# Patient Record
Sex: FEMALE | Race: WHITE
[De-identification: names, ages, dates, MRNs, and addresses within clinical notes are randomized per-mention and may not be internally consistent; named-entity substitution may affect disease eponyms.]

---

## 2017-07-26 ENCOUNTER — HOSPITAL ENCOUNTER (EMERGENCY)
Dept: HOSPITAL 74 - FER | Age: 47
Discharge: HOME | End: 2017-07-26
Payer: COMMERCIAL

## 2017-07-26 VITALS — HEART RATE: 56 BPM | DIASTOLIC BLOOD PRESSURE: 69 MMHG | SYSTOLIC BLOOD PRESSURE: 117 MMHG

## 2017-07-26 VITALS — BODY MASS INDEX: 19.2 KG/M2

## 2017-07-26 DIAGNOSIS — Z87.891: ICD-10-CM

## 2017-07-26 DIAGNOSIS — R55: Primary | ICD-10-CM

## 2017-07-26 LAB
ALBUMIN SERPL-MCNC: 4.5 G/DL (ref 3.5–5)
ALP SERPL-CCNC: 49 U/L (ref 32–92)
ALT SERPL-CCNC: 21 U/L (ref 10–40)
ANION GAP SERPL CALC-SCNC: 5 MMOL/L (ref 8–16)
AST SERPL-CCNC: 22 U/L (ref 10–42)
BASOPHILS # BLD: 0.6 % (ref 0–2)
BILIRUB SERPL-MCNC: 1 MG/DL (ref 0.2–1)
CALCIUM SERPL-MCNC: 9.4 MG/DL (ref 8.4–10.2)
CK SERPL-CCNC: 66 IU/L (ref 26–140)
CO2 SERPL-SCNC: 27 MMOL/L (ref 22–28)
CREAT SERPL-MCNC: 0.6 MG/DL (ref 0.6–1.3)
DEPRECATED RDW RBC AUTO: 11.8 % (ref 11.6–15.6)
EOSINOPHIL # BLD: 0.8 % (ref 0–4.5)
GLUCOSE SERPL-MCNC: 98 MG/DL (ref 74–106)
MCH RBC QN AUTO: 29.6 PG (ref 25.7–33.7)
MCHC RBC AUTO-ENTMCNC: 33.3 G/DL (ref 32–36)
MCV RBC: 88.7 FL (ref 80–96)
NEUTROPHILS # BLD: 68.9 % (ref 42.8–82.8)
PLATELET # BLD AUTO: 217 K/MM3 (ref 134–434)
PMV BLD: 8.4 FL (ref 7.5–11.1)
PROT SERPL-MCNC: 6.8 G/DL (ref 6.4–8.3)
TROPONIN I SERPL-MCNC: < 0.03 NG/ML (ref 0.03–0.5)
WBC # BLD AUTO: 7.8 K/MM3 (ref 4–10.8)

## 2017-07-26 PROCEDURE — 3E0337Z INTRODUCTION OF ELECTROLYTIC AND WATER BALANCE SUBSTANCE INTO PERIPHERAL VEIN, PERCUTANEOUS APPROACH: ICD-10-PCS

## 2017-07-26 NOTE — PDOC
History of Present Illness





- General


Chief Complaint: Syncope/Near Syncope


Stated Complaint: BLACK OUT/ HIT MY HEAD


Time Seen by Provider: 07/26/17 07:01





- History of Present Illness


Initial Comments: 


07/26/17 07:59


Chief complaint: Loss of consciousness





History of present illness: Patient recounts that soon after awakening this 

morning, she had severe crampy lower abdominal pain, had a bowel movement, and 

upon finishing the pain resolved but she felt lightheaded, diaphoretic, 

nauseated, and suddenly fell to the floor, losing consciousness for a few 

moments. She awoke without confusion but with a mild headache, though no head 

injury was apparent. 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 

syncope in the past, but this resolved completely and she has had no further 

episodes since she had an ablation in 1996. She continues to see cardiologist, 

Dr. Gutiérrez, on a regular basis, and had a normal stress test approximately 

one year ago for clearance prior to vein stripping on the left leg. There was 

no history of angina or coronary artery disease.





Review of systems: Denies chest pain, and at present abdominal pain, nausea, 

vomiting, diarrhea, hematemesis, melena, bloody stool, vaginal bleeding or 

discharge, urinary tract symptoms including dysuria, frequency, urgency, 

hesitancy, hematuria, visual or focal neurologic symptoms, unsteadiness of gait

, headache, lightheadedness, dizziness, vertigo. She admits mild dyspnea on 

exertion which has been present for several months, but for which she has not 

sought medical attention. All other systems reviewed and found to be negative





Past medical history: WPW with ablation, as noted above, prior syncope due to 

WPW before ablation, hysterectomy without oophorectomy, vein stripping left leg

, sinus surgery, migraine headaches,





Social history: Denies tobacco, alcohol, or nonprescription drugs. Fully active 

without disability. No recent weight gain or weight loss





Family history: Reviewed and noncontributory including early coronary artery 

disease or other cardiac diseases, metabolic disease including diabetes, cancer.





Physical exam: Alert and oriented 3, well-developed well-nourished, no acute 

distress, cheerful and cooperative. Asymptomatic at present


Afebrile, vital signs normal except for mild bradycardia, which is regular, and 

mild orthostatic changes.


Head with approximately 2 cm contusion left frontoparietal area. No depression 

or crepitus. No ecchymosis or fluctuance. No abrasion or laceration. Mildly 

tender to palpation.


PERRLA 3 mm, fundi benign with sharp disc margins and good central venous 

pulsations, no hemorrhages or exudates. Visual fields intact to confrontation


ENT clear


Neck supple without bruit mass or nodes. No point tenderness over the cervical 

spine. Full range of motion without pain


Chest clear to P&A


CV regular without murmur rub or gallop pulses full and symmetric no JVD or 

edema no bruits heart rate 55 and regular


Abdomen nondistended, bowel sounds normal, soft without mass tenderness or 

organomegaly


Neuro C2 to 12 intact. Strength full and symmetric. No focal sensory or motor 

deficits. Gait stable and unimpaired. Cerebellar intact


Skin clear, no rash, adequate turgor and wet mucous membranes


Extremities no CCE. No posterior calf swelling or tenderness.





Impression: This probably represents a vasovagal episode after experiencing 

abdominal pain and having a bowel movement. There is a history of syncope 

related to WPW which resolved completely after ablation in 1996, with no 

subsequent episodes until today. The patient has a mild bradycardia, and an old 

EKG obtained and consultation with the cardiologist will be undertaken.





Plan: EKG and enzymes, chest x-ray, and labs. Further medical management 

depending on results.














07/26/17 08:30








Past History





- Past Medical History


Allergies/Adverse Reactions: 


 Allergies











Allergy/AdvReac Type Severity Reaction Status Date / Time


 


No Known Drug Allergies Allergy   Verified 07/26/17 07:57


 


gluten Allergy Intermediate Nausea Uncoded 07/26/17 07:57











Home Medications: 


Ambulatory Orders





NK [No Known Home Medication]  07/26/17 








Anemia: No


Asthma: No


Cancer: No


Cardiac Disorders: No


CVA: No


COPD: No


CHF: No


Dementia: No


Diabetes: No


GI Disorders: No


 Disorders: No


HTN: No


Hypercholesterolemia: No


Liver Disease: No


Seizures: No


Thyroid Disease: No





- Surgical History


Abdominal Surgery: No


Appendectomy: No


Cardiac Surgery: No (wpw=ablation 1996)


Cholecystectomy: No


Lung Surgery: No


Neurologic Surgery: No


Orthopedic Surgery: No





- Psycho/Social/Smoking Cessation Hx


Anxiety: No


Suicidal Ideation: No


Smoking History: Former smoker


Have you smoked in the past 12 months: No


If you are a former smoker, when did you quit?: 20YRS AGO


Hx Alcohol Use: No


Drug/Substance Use Hx: No


Substance Use Type: None


Hx Substance Use Treatment: No





ED Treatment Course





- LABORATORY


CBC & Chemistry Diagram: 


 07/26/17 07:35





 07/26/17 07:35





Medical Decision Making





- Medical Decision Making


07/26/17 07:33


EKG: Sinus bradycardia 53/m. Normal axes and intervals. No significant ST-T 

wave changes. Except for the mild bradycardia, normal EKG.





07/26/17 08:23


Old EKGs were obtained. On 05/03/2010, the patient's heart rate was 55. 

Subsequent cardiograms from June 2012 in August 2014 showed heart rate of 67 

and 64 respectively. The EKGs were essentially unchanged otherwise.





Lab work, including cardiac enzymes, head CT, and chest x-ray all without 

significant abnormalities.





07/26/17 09:50


Spoke by phone to the patient's cardiologist, Dr. Gutiérrez. Symptoms, 

laboratory and radiological findings were discussed. He agreed with discharge 

and follow-up in the office in 2-3 days.





Patient received 1 L of intravenous fluids, her orthostatic changes have 

resolved and she feels fine. She is discharged, fully ambulatory and 

asymptomatic to follow-up with her cardiologist as directed, or return to ER if 

symptoms recur.





*DC/Admit/Observation/Transfer


Diagnosis at time of Disposition: 


 Vasovagal syncope





- Discharge Dispostion


Disposition: HOME


Condition at time of disposition: Improved


Admit: No





- Patient Instructions


Printed Discharge Instructions:  DI for Syncope in Adults (Fainting)


Additional Instructions: 


See your cardiologist in 2-3 days for follow-up. He has been consulted by 

phone. He is aware of your symptoms and laboratory results.





Rest, maintained adequate fluid intake. Return to ER if symptoms persist.





- Post Discharge Activity


Work/School Note:  Back to Work

## 2017-07-26 NOTE — EKG
Test Reason : 

Blood Pressure : ***/*** mmHG

Vent. Rate : 053 BPM     Atrial Rate : 053 BPM

   P-R Int : 144 ms          QRS Dur : 076 ms

    QT Int : 454 ms       P-R-T Axes : [phone] degrees

   QTc Int : 426 ms

 

SINUS BRADYCARDIA

NONSPECIFIC ST AND T WAVE ABNORMALITY

WHEN COMPARED WITH ECG OF 20-AUG-2014 11:54,

NONSPECIFIC ST AND T WAVE ABNORMALITY now present in aVL

Confirmed by LEROY WISE, OSCAR ([id number]) on 7/26/2017 1:11:39 PM

 

Referred By: JACKELYN BHATTI           Confirmed By:OSCAR HARPER MD

## 2021-08-07 ENCOUNTER — HOSPITAL ENCOUNTER (EMERGENCY)
Dept: HOSPITAL 74 - FER | Age: 51
Discharge: HOME | End: 2021-08-07
Payer: COMMERCIAL

## 2021-08-07 VITALS — TEMPERATURE: 99.2 F | HEART RATE: 86 BPM | DIASTOLIC BLOOD PRESSURE: 72 MMHG | SYSTOLIC BLOOD PRESSURE: 131 MMHG

## 2021-08-07 VITALS — BODY MASS INDEX: 22.1 KG/M2

## 2021-08-07 DIAGNOSIS — R53.81: Primary | ICD-10-CM

## 2021-08-07 DIAGNOSIS — R50.9: ICD-10-CM

## 2021-08-07 DIAGNOSIS — R05: ICD-10-CM

## 2021-08-07 PROCEDURE — U0003 INFECTIOUS AGENT DETECTION BY NUCLEIC ACID (DNA OR RNA); SEVERE ACUTE RESPIRATORY SYNDROME CORONAVIRUS 2 (SARS-COV-2) (CORONAVIRUS DISEASE [COVID-19]), AMPLIFIED PROBE TECHNIQUE, MAKING USE OF HIGH THROUGHPUT TECHNOLOGIES AS DESCRIBED BY CMS-2020-01-R: HCPCS

## 2021-08-07 PROCEDURE — C9803 HOPD COVID-19 SPEC COLLECT: HCPCS

## 2021-08-07 PROCEDURE — U0005 INFEC AGEN DETEC AMPLI PROBE: HCPCS
